# Patient Record
Sex: FEMALE | Race: WHITE | Employment: PART TIME | URBAN - METROPOLITAN AREA
[De-identification: names, ages, dates, MRNs, and addresses within clinical notes are randomized per-mention and may not be internally consistent; named-entity substitution may affect disease eponyms.]

---

## 2019-06-25 ENCOUNTER — OFFICE VISIT (OUTPATIENT)
Dept: ORTHOPEDIC SURGERY | Age: 49
End: 2019-06-25
Payer: COMMERCIAL

## 2019-06-25 VITALS
SYSTOLIC BLOOD PRESSURE: 113 MMHG | HEART RATE: 75 BPM | WEIGHT: 135 LBS | DIASTOLIC BLOOD PRESSURE: 71 MMHG | BODY MASS INDEX: 23.05 KG/M2 | HEIGHT: 64 IN

## 2019-06-25 DIAGNOSIS — M53.3 ACUTE COCCYGEAL PAIN: Primary | ICD-10-CM

## 2019-06-25 DIAGNOSIS — M46.1 BILATERAL SACROILIITIS (HCC): ICD-10-CM

## 2019-06-25 PROCEDURE — 99202 OFFICE O/P NEW SF 15 MIN: CPT | Performed by: PHYSICIAN ASSISTANT

## 2019-06-25 RX ORDER — METHYLPREDNISOLONE 4 MG/1
TABLET ORAL
Qty: 1 KIT | Refills: 0 | Status: SHIPPED | OUTPATIENT
Start: 2019-06-25 | End: 2019-07-10 | Stop reason: ALTCHOICE

## 2019-06-25 RX ORDER — MONTELUKAST SODIUM 10 MG/1
TABLET ORAL
COMMUNITY
Start: 2019-06-19

## 2019-06-26 NOTE — PROGRESS NOTES
Subjective:      Patient ID: Marci Wallace is a 54 y.o. female. Chief Complaint   Patient presents with    Tailbone Pain     Pain in tailbone onset 6/23/19        HPI:   She is here for an initial evaluation of a new problem. Pain in the sacroiliac region. Symptoms came on suddenly on 6/23/2019 after getting out of her car. She developed sharp pain in both left and right sacroiliac joint region. Pain Scale 8/10 VAS. Location of pain left and right SI joint. Pain is worse with movement. Pain improves with rest, particularly laying on either side. Previous treatments have included anti-inflammatory medications with minimal relief. She did have this exact same pain 12 years ago. She was in the process of getting set up for SI joint injections and symptoms are resolved with oral steroids which was prescribed for bronchitis. Review Of Systems:   A 14 point review of systems and history form completed by the patient has been reviewed. This scanned in the media tab of the patient's chart under today's date. As outlined in the HPI. Negative for fever or chills. Negative for joint pain, swelling and stiffness. Negative for numbness or tingling. History reviewed. No pertinent past medical history. History reviewed. No pertinent family history. History reviewed. No pertinent surgical history. Social History     Occupational History    Occupation:    Tobacco Use    Smoking status: Never Smoker    Smokeless tobacco: Never Used   Substance and Sexual Activity    Alcohol use: Yes    Drug use: Not on file    Sexual activity: Not on file       Current Outpatient Medications   Medication Sig Dispense Refill    montelukast (SINGULAIR) 10 MG tablet       methylPREDNISolone (MEDROL, CLARITA,) 4 MG tablet Take by mouth. 6 po day one 5 po day 2 4 po day 3 3 po day 4 2 po day 5 1 po day 6. 1 kit 0     No current facility-administered medications for this visit. Objective:     She is alert, oriented x 3, pleasant, well nourished, developed and in no   acute distress. /71   Pulse 75   Ht 5' 4\" (1.626 m)   Wt 135 lb (61.2 kg)   BMI 23.17 kg/m²      Examination of the left and right hip shows: There is not deformity. There is not erythema. Denilson's test positive for SI joint pain. There is mild pain in SI region with internal and external rotation. There is mild pain again in the SI region with flexion and extension. There is no pain with active SLR. ROM full range of motion. Leg lengths: Equal  Trochanteric region is not tender to palpation. Sacral Iliac is  tender to palpation. There is mild pain with weight bearing. LUMBAR SPINE EXAM:  Examination of the Lumbar spine shows:  Deformity Absent . Soft Tissue Swelling Absent . Soft Tissue Tenderness Absent . Midline Bone Tenderness Absent . Paraspinal Muscular Spasm Absent . Previous Incisions Absent . Erythema Absent . Lumbar Flexion does not produce pain. Lumbar Extension does not produce pain. NEUROLOGICAL EXAM:  SLR     Left: Negative. Right Negative. DTR 2+ bilaterally  Motor Strength Exam:  5/5 in all major motor groups of the lower extremities. Sanabria's Sign Absent   Sensation to Touch normal    VASCULAR EXAM:  Examination of the upper and lower extremities shows intact perfusion to all extremities, no cyanosis, digits are warm to touch, capillary refill is less than 2 seconds. No significant edema noted. SKIN:  Examination of the skin reveals the skin to be intact without lacerations, abrasions, significant erythema, rashes or skin lesions. X Rays: performed in the office today:   AP and lateral x-ray of the sacrum and coccyx is obtained. No acute bony deformity. Minimal sclerosis of the SI joints. Additional Tests reviewed: none  Additional Outside Records reviewed: none    Diagnosis:       ICD-10-CM    1.  Acute coccygeal pain M53.3 XR SACRUM COCCYX (MIN 2 VIEWS)   2. Bilateral sacroiliitis (HCC) M46.1 MRI PELVIS WO CONTRAST     CANCELED: MRI SACRUM COCCYX WO CONTRAST        Assessment and Plan:       I believe she has acute coccygeal pain due to bilateral sacroiliitis. Previous sacroiliitis symptoms improved with Medrol Dosepak. I will prescribe a Medrol Dosepak today. If symptoms fail to improve then consider MRI of the pelvis to better evaluate her SI joints. The natural history of the patient's diagnosis as well as the treatment options were discussed in full and questions were answered. Risks and benefits of the treatment options also reviewed in detail. She is leaving for vacation in 2 weeks. She is having severe pain 10/10 and difficulty ambulating due to the SI pain. She is unable to perform her ADLs. Patient as well as  would like to go ahead and proceed with MRI of the pelvis now in addition to treating with the oral steroids. Given her situation, I think this is reasonable. MRI order provided today. Follow Up: After MRI to discuss results. Call or return to clinic prn if these symptoms worsen or fail to improve as anticipated.

## 2019-07-02 ENCOUNTER — HOSPITAL ENCOUNTER (OUTPATIENT)
Dept: MRI IMAGING | Age: 49
Discharge: HOME | End: 2019-07-02
Payer: COMMERCIAL

## 2019-07-02 DIAGNOSIS — M46.1 BILATERAL SACROILIITIS (HCC): ICD-10-CM

## 2019-07-02 PROCEDURE — 72195 MRI PELVIS W/O DYE: CPT

## 2019-07-10 ENCOUNTER — OFFICE VISIT (OUTPATIENT)
Dept: ORTHOPEDIC SURGERY | Age: 49
End: 2019-07-10
Payer: COMMERCIAL

## 2019-07-10 VITALS — RESPIRATION RATE: 16 BRPM | BODY MASS INDEX: 23.05 KG/M2 | HEIGHT: 64 IN | TEMPERATURE: 98.3 F | WEIGHT: 135 LBS

## 2019-07-10 DIAGNOSIS — M53.3 ACUTE COCCYGEAL PAIN: Primary | ICD-10-CM

## 2019-07-10 PROCEDURE — 99212 OFFICE O/P EST SF 10 MIN: CPT | Performed by: PHYSICIAN ASSISTANT

## 2019-07-10 RX ORDER — METHYLPREDNISOLONE 4 MG/1
TABLET ORAL
Qty: 1 KIT | Refills: 0 | Status: SHIPPED | OUTPATIENT
Start: 2019-07-10

## 2019-07-10 NOTE — PROGRESS NOTES
Subjective:      Patient ID: Kacey Welsh is a 54 y.o.  female. Chief Complaint   Patient presents with    Follow-up     MRI results         HPI: She is here for follow-up on her sacral pain. Recently underwent MRI. It took 10 days after starting the initial Medrol Dosepak to begin to notice some relief. Since that time she has completed a second Medrol Dosepak. She is also been in chiropractic treatment with manipulations with significant improvement. She still has mild discomfort in the left and right sacroiliac region. She denies numbness, tingling, referred pain. Review of Systems:   Negative for fever or chills. Negative for numbness and/or tingling. History reviewed. No pertinent past medical history. History reviewed. No pertinent family history. History reviewed. No pertinent surgical history. Social History     Occupational History    Occupation:    Tobacco Use    Smoking status: Never Smoker    Smokeless tobacco: Never Used   Substance and Sexual Activity    Alcohol use: Yes    Drug use: Not on file    Sexual activity: Not on file       Current Outpatient Medications   Medication Sig Dispense Refill    methylPREDNISolone (MEDROL, CLARITA,) 4 MG tablet Take by mouth. 6 po day one 5 po day 2 4 po day 3 3 po day 4 2 po day 5 1 po day 6. 1 kit 0    montelukast (SINGULAIR) 10 MG tablet        No current facility-administered medications for this visit. Objective:   She is alert, oriented x 3, pleasant, well nourished, developed and in no acute distress. Temp 98.3 °F (36.8 °C) (Temporal)   Resp 16   Ht 5' 4\" (1.626 m)   Wt 135 lb (61.2 kg)   BMI 23.17 kg/m²      HIP EXAM:  Examination of the left and right hip shows: There is not deformity. There is not erythema. There is no pain with internal and external rotation. There is no pain with flexion and extension. There is no pain with active SLR. ROM full range of motion.   Leg lengths: questions were answered. Risks and benefits of the treatment options also reviewed in detail. At this time I would continue with chiropractic therapy since this seems to be helping. She was provided a prescription for another Medrol Dosepak. She may take this prescription with her on her vacation. She is leaving Friday, 2 days from now. For the meantime I would continue with anti-inflammatory medications such as Advil or Aleve. Did recommend further evaluation by her gynecologist to see if the intrapelvic structures may be causing some of these complaints. Follow Up:   Call or return to clinic prn if these symptoms worsen or fail to improve as anticipated.